# Patient Record
Sex: MALE | Race: WHITE | Employment: FULL TIME | URBAN - METROPOLITAN AREA
[De-identification: names, ages, dates, MRNs, and addresses within clinical notes are randomized per-mention and may not be internally consistent; named-entity substitution may affect disease eponyms.]

---

## 2018-08-20 ENCOUNTER — HOSPITAL ENCOUNTER (EMERGENCY)
Facility: CLINIC | Age: 48
Discharge: HOME | End: 2018-08-20
Attending: NURSE PRACTITIONER | Admitting: NURSE PRACTITIONER
Payer: COMMERCIAL

## 2018-08-20 VITALS
DIASTOLIC BLOOD PRESSURE: 75 MMHG | RESPIRATION RATE: 18 BRPM | HEART RATE: 67 BPM | SYSTOLIC BLOOD PRESSURE: 107 MMHG | TEMPERATURE: 97.1 F | OXYGEN SATURATION: 99 %

## 2018-08-20 DIAGNOSIS — S80.812A: ICD-10-CM

## 2018-08-20 DIAGNOSIS — S90.512A ABRASION OF LEFT ANKLE, INITIAL ENCOUNTER: ICD-10-CM

## 2018-08-20 PROCEDURE — 90471 IMMUNIZATION ADMIN: CPT

## 2018-08-20 PROCEDURE — 25000128 H RX IP 250 OP 636: Performed by: NURSE PRACTITIONER

## 2018-08-20 PROCEDURE — 99282 EMERGENCY DEPT VISIT SF MDM: CPT | Mod: 25

## 2018-08-20 PROCEDURE — 90715 TDAP VACCINE 7 YRS/> IM: CPT | Performed by: NURSE PRACTITIONER

## 2018-08-20 RX ADMIN — CLOSTRIDIUM TETANI TOXOID ANTIGEN (FORMALDEHYDE INACTIVATED), CORYNEBACTERIUM DIPHTHERIAE TOXOID ANTIGEN (FORMALDEHYDE INACTIVATED), BORDETELLA PERTUSSIS TOXOID ANTIGEN (GLUTARALDEHYDE INACTIVATED), BORDETELLA PERTUSSIS FILAMENTOUS HEMAGGLUTININ ANTIGEN (FORMALDEHYDE INACTIVATED), BORDETELLA PERTUSSIS PERTACTIN ANTIGEN, AND BORDETELLA PERTUSSIS FIMBRIAE 2/3 ANTIGEN 0.5 ML: 5; 2; 2.5; 5; 3; 5 INJECTION, SUSPENSION INTRAMUSCULAR at 22:31

## 2018-08-20 ASSESSMENT — ENCOUNTER SYMPTOMS
NUMBNESS: 0
WOUND: 1

## 2018-08-20 NOTE — ED AVS SNAPSHOT
[facility] Emergency Department    [address]    [facility] [address]    Phone:  [phone]    Fax:  [phone]                                       Aly Kearney   MRN: [id number]    Department:  [facility] Emergency Department   Date of Visit:  8/20/2018           After Visit Summary Signature Page     I have received my discharge instructions, and my questions have been answered. I have discussed any challenges I see with this plan with the nurse or doctor.    ..........................................................................................................................................  Patient/Patient Representative Signature      ..........................................................................................................................................  Patient Representative Print Name and Relationship to Patient    ..................................................               ................................................  Date                                            Time    ..........................................................................................................................................  Reviewed by Signature/Title    ...................................................              ..............................................  Date                                                            Time

## 2018-08-20 NOTE — ED AVS SNAPSHOT
[facility] Emergency Department    [address] [facility] [address]    Phone:  [phone]    Fax:  [phone]                                       Aly Kearney   MRN: [id number]    Department:  [facility] Emergency Department   Date of Visit:  8/20/2018           Patient Information     Date Of Birth          1970        Your diagnoses for this visit were:     Abrasion of left ankle, initial encounter     Abrasion of lower extremity, left, initial encounter        You were seen by Debbie Howell APRN CNP.      Follow-up Information     Follow up with Yaquelin, Zulema Otero In 2 days.    Why:  For wound re-check    Contact information:    [address] [facility] [id number] [phone]          Follow up with [facility] Emergency Department.    Specialty:  EMERGENCY MEDICINE    Why:  As needed, If symptoms worsen    Contact information:    [address] [facility] [address] [phone]        Discharge Instructions       Beginning tomorrow:    Wash wound with mild soap and water daily.  Apply bacitracin ointment to area.  Cover with nonadherent dressing and hold in place with ace wrap  Monitor for signs of infection including increasing redness around wound, yellow discharge, fevers, or any further concerns.     Abrasions  Abrasions are skin scrapes. Their treatment depends on how large and deep the abrasion is.  Home care  You may be prescribed an antibiotic cream or ointment to apply to the wound. This helps prevent infection. Follow instructions when using this medicine.  General care    To care for the abrasion, do the following each day for as long as directed by your healthcare provider.  ? If you were given a bandage, change it once a day. If your bandage sticks to the wound, soak it in warm water until it loosens.  ? Wash the area with soap and warm water. You may do this in a sink or under a tub  faucet or shower. Rinse off the soap. Then pat the area dry with a clean towel.  ? If antibiotic ointment or cream was prescribed, reapply it to the wound as directed. Cover the wound with a fresh nonstick bandage. If the bandage becomes wet or dirty, change it as soon as possible.  ? Some antibiotic ointments or cream can cause an allergic reaction or dermatitis. This may cause redness, itching and or hives. If this occurs, stop using the ointment immediately and wash off any remaining ointment. You may need to take some allergy medicine to relieve symptoms.    You may use acetaminophen or ibuprofen to control pain unless another pain medicine was prescribed. Talk with your healthcare provider before using these medicines if you have chronic liver or kidney disease or ever had a stomach ulcer or GI bleeding. Don t use ibuprofen in children younger than six months old.    Most skin wounds heal within 10 days. But an infection may occur even with treatment. So it s important to watch the wound for signs of infection as listed below.  Follow-up care  Follow up with your healthcare provider, or as advised.  When to seek medical advice  Call your healthcare provider right away if any of these occur:    Fever of 100.4 F (38 C) or higher, or as directed by your healthcare provider    Increasing pain, redness, swelling, or drainage from the wound    Bleeding from the wound that does not stop after a few minutes of steady, firm pressure    Decreased ability to move any body part near the wound  Date Last Reviewed: 3/3/2017    6208-1284 [employer] [address] [place] [address], [place] [address]. All rights reserved. This information is not intended as a substitute for professional medical care. Always follow your healthcare professional's instructions.          24 Hour Appointment Hotline       To make an appointment at any [facility], call [phone] ([phone]). If you don't have a family doctor or  clinic, we will help you find one. [facility] are conveniently located to serve the needs of you and your family.             Review of your medicines      Notice     You have not been prescribed any medications.            Orders Needing Specimen Collection     None      Pending Results     No orders found from 8/18/2018 to 8/21/2018.            Pending Culture Results     No orders found from 8/18/2018 to 8/21/2018.            Pending Results Instructions     If you had any lab results that were not finalized at the time of your Discharge, you can call the ED Lab Result RN at [phone]. You will be contacted by this team for any positive Lab results or changes in treatment. The nurses are available 7 days a week from 10A to 6:30P.  You can leave a message 24 hours per day and they will return your call.        Test Results From Your Hospital Stay               Clinical Quality Measure: Blood Pressure Screening     Your blood pressure was checked while you were in the emergency department today. The last reading we obtained was  BP: 107/75 . Please read the guidelines below about what these numbers mean and what you should do about them.  If your systolic blood pressure (the top number) is less than 120 and your diastolic blood pressure (the bottom number) is less than 80, then your blood pressure is normal. There is nothing more that you need to do about it.  If your systolic blood pressure (the top number) is 120-139 or your diastolic blood pressure (the bottom number) is 80-89, your blood pressure may be higher than it should be. You should have your blood pressure rechecked within a year by a primary care provider.  If your systolic blood pressure (the top number) is 140 or greater or your diastolic blood pressure (the bottom number) is 90 or greater, you may have high blood pressure. High blood pressure is treatable, but if left untreated over time it can put you at risk for heart attack, stroke, or  "kidney failure. You should have your blood pressure rechecked by a primary care provider within the next 4 weeks.  If your provider in the emergency department today gave you specific instructions to follow-up with your doctor or provider even sooner than that, you should follow that instruction and not wait for up to 4 weeks for your follow-up visit.        Thank you for choosing [facility]       Thank you for choosing [facility] for your care. Our goal is always to provide you with excellent care. Hearing back from our patients is one way we can continue to improve our services. Please take a few minutes to complete the written survey that you may receive in the mail after you visit with us. Thank you!        [email or web address] Information     [employer] lets you send messages to your doctor, view your test results, renew your prescriptions, schedule appointments and more. To sign up, go to [email or web address] . Click on \"Log in\" on the left side of the screen, which will take you to the Welcome page. Then click on \"Sign up Now\" on the right side of the page.     You will be asked to enter the access code listed below, as well as some personal information. Please follow the directions to create your username and password.     Your access code is: [email or web address]  Expires: 2018 10:54 PM     Your access code will  in 90 days. If you need help or a new code, please call your [facility] clinic or [phone].        Care EveryWhere ID     This is your Care EveryWhere ID. This could be used by other organizations to access your [facility] medical records  [id number]        Equal Access to Services     [facility] HospitalNIKHIL : Hadii eloina deeo Soponcho, waaxda luqadaha, qaybta kaalmada adefrederickyamarta, camilo hightower . So [facility] [phone].    ATENCIÓN: Si habla español, tiene a perez disposición servicios gratuitos de asistencia lingüística. Llame al [phone].    We comply with applicable federal civil rights " laws and [place] laws. We do not discriminate on the basis of race, color, national origin, age, disability, sex, sexual orientation, or gender identity.            After Visit Summary       This is your record. Keep this with you and show to your community pharmacist(s) and doctor(s) at your next visit.

## 2018-08-21 NOTE — ED TRIAGE NOTES
Left leg sig abrasion after flipping golf cart. Finished game and came here.    Pt A&O x 3, CMS x 3, ABCD's adequate in triage

## 2018-08-21 NOTE — DISCHARGE INSTRUCTIONS
Beginning tomorrow:    Wash wound with mild soap and water daily.  Apply bacitracin ointment to area.  Cover with nonadherent dressing and hold in place with ace wrap  Monitor for signs of infection including increasing redness around wound, yellow discharge, fevers, or any further concerns.     Abrasions  Abrasions are skin scrapes. Their treatment depends on how large and deep the abrasion is.  Home care  You may be prescribed an antibiotic cream or ointment to apply to the wound. This helps prevent infection. Follow instructions when using this medicine.  General care    To care for the abrasion, do the following each day for as long as directed by your healthcare provider.  ? If you were given a bandage, change it once a day. If your bandage sticks to the wound, soak it in warm water until it loosens.  ? Wash the area with soap and warm water. You may do this in a sink or under a tub faucet or shower. Rinse off the soap. Then pat the area dry with a clean towel.  ? If antibiotic ointment or cream was prescribed, reapply it to the wound as directed. Cover the wound with a fresh nonstick bandage. If the bandage becomes wet or dirty, change it as soon as possible.  ? Some antibiotic ointments or cream can cause an allergic reaction or dermatitis. This may cause redness, itching and or hives. If this occurs, stop using the ointment immediately and wash off any remaining ointment. You may need to take some allergy medicine to relieve symptoms.    You may use acetaminophen or ibuprofen to control pain unless another pain medicine was prescribed. Talk with your healthcare provider before using these medicines if you have chronic liver or kidney disease or ever had a stomach ulcer or GI bleeding. Don t use ibuprofen in children younger than six months old.    Most skin wounds heal within 10 days. But an infection may occur even with treatment. So it s important to watch the wound for signs of infection as listed  below.  Follow-up care  Follow up with your healthcare provider, or as advised.  When to seek medical advice  Call your healthcare provider right away if any of these occur:    Fever of 100.4 F (38 C) or higher, or as directed by your healthcare provider    Increasing pain, redness, swelling, or drainage from the wound    Bleeding from the wound that does not stop after a few minutes of steady, firm pressure    Decreased ability to move any body part near the wound  Date Last Reviewed: 3/3/2017    4865-8871 [employer] [address] [place], [place] [address]. All rights reserved. This information is not intended as a substitute for professional medical care. Always follow your healthcare professional's instructions.

## 2018-08-21 NOTE — ED PROVIDER NOTES
History     Chief Complaint:  Leg Pain    HPI   Aly Kearney is a 48 year old male who presents to the emergency department today with leg pain. The patient was golfing when he and his significant other tipped the golf cart and his left leg got caught underneath and scratched.  He did not hit is head or lose consciousness.  He was not consuming alcohol. This happened about 3 hours ago.  He was able to finish round of golf and then decided to come to ED due to concern with infection. He denies significant pain. The patient denies numbness, tingling or bony tenderness. The patient has been ambulating normally. Denies any further injuries.  Unsure date of last tetanus.    Allergies:  Penicillins  Shellfish-Derived Products    Medications:    The patient is not currently taking any prescribed medications.    Past Medical History:    Herpes    Past Surgical History:    History reviewed. No pertinent past surgical history.    Family History:    History reviewed. No pertinent family history.     Social History:  The patient was accompanied to the ED by significant other.  Smoking Status: Never  Alcohol Use: Yes     Review of Systems   Skin: Positive for wound (left leg).   Neurological: Negative for numbness.   All other systems reviewed and are negative.    Physical Exam     Patient Vitals for the past 24 hrs:   BP Temp Temp src Pulse Resp SpO2   08/20/18 2155 107/75 97.1  F (36.2  C) Temporal 67 18 99 %      Physical Exam  Constitutional: Alert, attentive, GCS 15.    HEENT: Conjunctiva normal. Mucous membranes moist  CV: regular rate and rhythm; no murmurs, rubs or gallups. Cap refill <2 seconds.  Respiratory: Effort normal. Lungs clear to auscultation bilaterally. No crackles/rubs/wheezes.  Good air movement.  GI: Soft, nondistended.  No tenderness, rebound, or guarding.  MSK: No bony tenderness of left foot, ankle, tibia, fibula, and knee. No pain with range of motion of ankle, knee, and hip.  No tenderness  with palpation down the spine.  No pain with range of motion of all other major joints.  Neurological: Alert, attentive.  Distal sensation in toes intact to light touch.   Skin:  Left calf and ankle with large superficial abrasion.   Psychiatric: Normal affect.     Emergency Department Course   Interventions:  2231: Tdap 0.5m: Intramuscular    Emergency Department Course:  Nursing notes and vitals reviewed.  2207: I performed an exam of the patient as documented above.   2250 :Findings and plan explained to the Patient. Patient discharged home with instructions regarding supportive care, medications, and reasons to return. The importance of close follow-up was reviewed.   I personally answered all related questions prior to discharge.     Impression & Plan    Medical Decision Making:  Aly Kearney is a 48 year old male presents for evaluation of abrasion to left lower leg.  He has no bony tenderness or pain with range of motion concerning for fracture.  He has a normal and steady gait.  There is no evidence of neurovascular compromise.  Abrasion was thoroughly irrigated and scrubbed with normal saline and Shur-Clens.  There are no lacerations amenable to drainage.  The wound was then dressed with bacitracin, nonadherent dressings and was wrapped.  Tetanus was updated.  Head to toe exam otherwise negative for acute trauma.  Discussed wound care at length with patient including daily dressing changes.  He will follow-up with PCP in 2 days to recheck the wound.  Signs of infection discussed and should warrant close follow-up.  Return to ED with fevers, increasing pain, evidence of infection, evidence of neurovascular compromise or any further concerns.      Diagnosis:    ICD-10-CM    1. Abrasion of left ankle, initial encounter S90.512A    2. Abrasion of lower extremity, left, initial encounter S80.812A        Disposition:  discharged to home    Discharge Medications:  New Prescriptions    No medications on file      Scribe Disclosure:  I, Rina Ramirez, am serving as a scribe at 10:07 PM on 8/20/2018 to document services personally performed by Debbie Howell APRN based on my observations and the provider's statements to me.    8/20/2018   [facility] EMERGENCY DEPARTMENT       Debbie Howell APRN CNP  08/20/18 7484

## (undated) RX ORDER — GINSENG 100 MG
CAPSULE ORAL
Status: DISPENSED
Start: 2018-08-20